# Patient Record
Sex: MALE | Race: WHITE | NOT HISPANIC OR LATINO | URBAN - METROPOLITAN AREA
[De-identification: names, ages, dates, MRNs, and addresses within clinical notes are randomized per-mention and may not be internally consistent; named-entity substitution may affect disease eponyms.]

---

## 2017-02-09 ENCOUNTER — COMMUNICATION - HEALTHEAST (OUTPATIENT)
Dept: TELEHEALTH | Facility: CLINIC | Age: 32
End: 2017-02-09

## 2017-02-09 ENCOUNTER — OFFICE VISIT - HEALTHEAST (OUTPATIENT)
Dept: INTERNAL MEDICINE | Facility: CLINIC | Age: 32
End: 2017-02-09

## 2017-02-09 DIAGNOSIS — Z72.0 TOBACCO ABUSE: ICD-10-CM

## 2017-02-09 DIAGNOSIS — Z00.00 HEALTHCARE MAINTENANCE: ICD-10-CM

## 2017-02-09 DIAGNOSIS — F10.90 HEAVY ALCOHOL CONSUMPTION: ICD-10-CM

## 2017-02-09 DIAGNOSIS — M54.42 LEFT-SIDED LOW BACK PAIN WITH SCIATICA, SCIATICA LATERALITY UNSPECIFIED, UNSPECIFIED CHRONICITY: ICD-10-CM

## 2017-02-09 RX ORDER — NAPROXEN 500 MG/1
500 TABLET ORAL EVERY 12 HOURS
Status: SHIPPED | COMMUNITY
Start: 2017-02-09

## 2017-02-09 RX ORDER — IBUPROFEN 200 MG
200 TABLET ORAL EVERY 6 HOURS PRN
Status: SHIPPED | COMMUNITY
Start: 2017-02-09

## 2017-02-09 ASSESSMENT — MIFFLIN-ST. JEOR: SCORE: 1974.89

## 2017-02-23 ENCOUNTER — OFFICE VISIT - HEALTHEAST (OUTPATIENT)
Dept: INTERNAL MEDICINE | Facility: CLINIC | Age: 32
End: 2017-02-23

## 2017-02-23 DIAGNOSIS — M99.04 SOMATIC DYSFUNCTION OF SACRAL REGION: ICD-10-CM

## 2017-02-23 DIAGNOSIS — M99.02 SOMATIC DYSFUNCTION OF THORACIC REGION: ICD-10-CM

## 2017-02-23 DIAGNOSIS — M54.16 LUMBAR RADICULOPATHY: ICD-10-CM

## 2017-02-23 DIAGNOSIS — M99.03 SOMATIC DYSFUNCTION OF LUMBAR REGION: ICD-10-CM

## 2017-02-23 DIAGNOSIS — M99.06 SOMATIC DYSFUNCTION OF LOWER EXTREMITIES: ICD-10-CM

## 2017-02-23 DIAGNOSIS — M99.08 SOMATIC DYSFUNCTION OF RIB: ICD-10-CM

## 2017-02-23 DIAGNOSIS — M99.05 SOMATIC DYSFUNCTION OF PELVIC REGION: ICD-10-CM

## 2017-02-23 ASSESSMENT — MIFFLIN-ST. JEOR: SCORE: 1963.55

## 2017-02-24 ENCOUNTER — OFFICE VISIT - HEALTHEAST (OUTPATIENT)
Dept: PHYSICAL THERAPY | Facility: REHABILITATION | Age: 32
End: 2017-02-24

## 2017-02-24 DIAGNOSIS — M79.18 MYOFASCIAL PAIN: ICD-10-CM

## 2017-02-24 DIAGNOSIS — M51.24 THORACIC DISC HERNIATION: ICD-10-CM

## 2017-02-24 DIAGNOSIS — M53.86 DECREASED ROM OF LUMBAR SPINE: ICD-10-CM

## 2017-02-24 DIAGNOSIS — M54.16 LEFT LUMBAR RADICULOPATHY: ICD-10-CM

## 2017-02-24 DIAGNOSIS — M53.84 DECREASED ROM OF THORACIC SPINE: ICD-10-CM

## 2017-02-24 DIAGNOSIS — M62.81 GENERALIZED MUSCLE WEAKNESS: ICD-10-CM

## 2017-02-28 ENCOUNTER — OFFICE VISIT - HEALTHEAST (OUTPATIENT)
Dept: PHYSICAL THERAPY | Facility: REHABILITATION | Age: 32
End: 2017-02-28

## 2017-02-28 DIAGNOSIS — M53.86 DECREASED ROM OF LUMBAR SPINE: ICD-10-CM

## 2017-02-28 DIAGNOSIS — M54.16 LEFT LUMBAR RADICULOPATHY: ICD-10-CM

## 2017-02-28 DIAGNOSIS — M79.18 MYOFASCIAL PAIN: ICD-10-CM

## 2017-02-28 DIAGNOSIS — M62.81 GENERALIZED MUSCLE WEAKNESS: ICD-10-CM

## 2017-02-28 DIAGNOSIS — M53.84 DECREASED ROM OF THORACIC SPINE: ICD-10-CM

## 2017-02-28 DIAGNOSIS — M51.24 THORACIC DISC HERNIATION: ICD-10-CM

## 2017-03-06 ENCOUNTER — OFFICE VISIT - HEALTHEAST (OUTPATIENT)
Dept: PHYSICAL THERAPY | Facility: REHABILITATION | Age: 32
End: 2017-03-06

## 2017-03-06 DIAGNOSIS — M53.84 DECREASED ROM OF THORACIC SPINE: ICD-10-CM

## 2017-03-06 DIAGNOSIS — M54.16 LEFT LUMBAR RADICULOPATHY: ICD-10-CM

## 2017-03-06 DIAGNOSIS — M53.86 DECREASED ROM OF LUMBAR SPINE: ICD-10-CM

## 2017-03-06 DIAGNOSIS — M62.81 GENERALIZED MUSCLE WEAKNESS: ICD-10-CM

## 2017-03-06 DIAGNOSIS — M51.24 THORACIC DISC HERNIATION: ICD-10-CM

## 2017-03-06 DIAGNOSIS — M79.18 MYOFASCIAL PAIN: ICD-10-CM

## 2017-03-09 ENCOUNTER — OFFICE VISIT - HEALTHEAST (OUTPATIENT)
Dept: INTERNAL MEDICINE | Facility: CLINIC | Age: 32
End: 2017-03-09

## 2017-03-09 ENCOUNTER — AMBULATORY - HEALTHEAST (OUTPATIENT)
Dept: LAB | Facility: CLINIC | Age: 32
End: 2017-03-09

## 2017-03-09 DIAGNOSIS — M99.08 SOMATIC DYSFUNCTION OF RIB: ICD-10-CM

## 2017-03-09 DIAGNOSIS — M99.02 SOMATIC DYSFUNCTION OF THORACIC REGION: ICD-10-CM

## 2017-03-09 DIAGNOSIS — M99.03 SOMATIC DYSFUNCTION OF LUMBAR REGION: ICD-10-CM

## 2017-03-09 DIAGNOSIS — Z00.00 HEALTHCARE MAINTENANCE: ICD-10-CM

## 2017-03-09 DIAGNOSIS — M54.16 LUMBAR RADICULOPATHY: ICD-10-CM

## 2017-03-09 DIAGNOSIS — M99.06 SOMATIC DYSFUNCTION OF LOWER EXTREMITIES: ICD-10-CM

## 2017-03-09 DIAGNOSIS — M99.04 SOMATIC DYSFUNCTION OF SACRAL REGION: ICD-10-CM

## 2017-03-09 DIAGNOSIS — M99.05 SOMATIC DYSFUNCTION OF PELVIC REGION: ICD-10-CM

## 2017-03-09 ASSESSMENT — MIFFLIN-ST. JEOR: SCORE: 1998.48

## 2018-10-27 ENCOUNTER — AMBULATORY - HEALTHEAST (OUTPATIENT)
Dept: NURSING | Facility: CLINIC | Age: 33
End: 2018-10-27

## 2019-06-18 ENCOUNTER — OFFICE VISIT - HEALTHEAST (OUTPATIENT)
Dept: FAMILY MEDICINE | Facility: CLINIC | Age: 34
End: 2019-06-18

## 2019-06-18 DIAGNOSIS — J36 PERITONSILLAR ABSCESS: ICD-10-CM

## 2019-10-11 ENCOUNTER — AMBULATORY - HEALTHEAST (OUTPATIENT)
Dept: NURSING | Facility: CLINIC | Age: 34
End: 2019-10-11

## 2019-10-11 DIAGNOSIS — Z23 NEED FOR INFLUENZA VACCINATION: ICD-10-CM

## 2021-02-17 ENCOUNTER — AMBULATORY - HEALTHEAST (OUTPATIENT)
Dept: FAMILY MEDICINE | Facility: CLINIC | Age: 36
End: 2021-02-17

## 2021-02-17 DIAGNOSIS — Z20.822 EXPOSURE TO COVID-19 VIRUS: ICD-10-CM

## 2021-05-29 NOTE — PROGRESS NOTES
"Chief Complaint   Patient presents with     Mouth Lesions     Pt c/o cant eat or talk, sores in his mouth since Sunday, fever, he takes 600 mg ibuprofen q5h.        HPI: 34-year-old male with a past medical history of EtOH and tobacco abuse, presents today with a 2-day history of severe throat pain, fever and difficulty swallowing.  He has also noticed a \"lump\" in his left neck for the past several weeks.  This is in the context of his daughters also having pharyngitis.    Old records from 3/9/2017 show patient has a history of lumbar radiculopathy.    ROS: Positive for fever.  Negative for vomiting or diarrhea or rashes.  10 point system review otherwise negative except for EtOH and tobacco use.    SH:    reports that he has been smoking cigarettes.  He does not have any smokeless tobacco history on file. He reports that he drinks about 15.0 oz of alcohol per week.      FH: The Patient's family history includes Alzheimer's disease in his maternal grandfather; Breast cancer in his maternal grandmother and paternal grandmother; Cancer in his maternal grandfather, maternal grandmother, and paternal grandfather; Colon cancer in his maternal grandfather; Macular degeneration in his maternal grandfather and paternal grandmother; Prostate cancer in his maternal grandfather; Skin cancer in his maternal grandmother, paternal grandfather, and paternal grandmother.     Meds:  Johann has a current medication list which includes the following prescription(s): ibuprofen and naproxen.    O:  /80   Pulse 82   Temp 98.8  F (37.1  C)   Resp 16   Wt (!) 224 lb (101.6 kg)   SpO2 99%   BMI 30.17 kg/m     Constitutional:    --Vitals as above  --No acute distress  Eyes-  --Sclera noninjected  --Lids and conjunctiva normal  ENT-  --TMs clear  --Sclera noninjected  --Pharynx markedly erythematous with 3+ swollen tonsils that are inflamed  Neck-  --Neck supple    Lymph-  --moderate painful cervical lymphadenopathy  Lungs-  --Clear " to Auscultation  Heart-  --Regular rate and rhythm  Skin-  --Pink and dry          A/P:   1. Peritonsillar abscess  The patient has concerns for peritonsillar abscess which could cause airway disruption and I am dispatching immediately to [facility] emergency department.  Case discussed with emergency room staff.

## 2021-05-30 VITALS — BODY MASS INDEX: 29.65 KG/M2 | WEIGHT: 218.9 LBS | HEIGHT: 72 IN

## 2021-05-30 VITALS — HEIGHT: 72 IN | WEIGHT: 216.4 LBS | BODY MASS INDEX: 29.31 KG/M2

## 2021-05-30 VITALS — BODY MASS INDEX: 30.35 KG/M2 | HEIGHT: 72 IN | WEIGHT: 224.1 LBS

## 2021-06-03 VITALS — WEIGHT: 224 LBS | BODY MASS INDEX: 30.17 KG/M2

## 2021-06-08 NOTE — PROGRESS NOTES
"ASSESSMENT:  1. Healthcare maintenance  Johann is a 31-year-old  for wine and beer, who presents to [facility] care.  First of all, he expressed concerns about heavy alcohol consumption, which he does not view as a problem in the sense of addiction or dependence, but more about general health.  See discussion below.  Check fasting labs in the future after 8 hour fast.  - Influenza, Seasonal,Quad Inj, 36+ MOS  - Tdap vaccine,  8yo or older,  IM  - Comprehensive Metabolic Panel; Future  - Lipid Cascade; Future    2. 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  He was interested in seeing the psychologist for this as well as his alcohol consumption and overall productiveness.  He was somewhat vague about this.  He has very analytical, and has a fairly circumferential thought pattern.  - Ambulatory referral to Psychology    3. Left-sided low back pain with sciatica, sciatica laterality unspecified, unspecified chronicity  He is getting left low back spasms, as he usually does when he gains weight.  He wants to do physical therapy, interested in OMT, but will first check for coverage.  He has a friend that went to [facility], interested in the osteopathic philosophy.  - Ambulatory referral to Physical Therapy  -We discussed weight loss, he is using myfitnesspal    4. Heavy alcohol consumption  See above.  He estimates drinking about 20 alcoholic beverages per week.  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  - Ambulatory referral to Psychology    5. BMI 29.0-29.9,adult  See above  - Ambulatory referral to " Psychology        PLAN:  Patient Instructions   Track alcohol intake over the week    Try to cut back to 14 or less per week     Return for osteopathic manual treatment     Back exercise    Recommend quitting smoking      Orders Placed This Encounter   Procedures     Influenza, Seasonal,Quad Inj, 36+ MOS     Tdap vaccine,  8yo or older,  IM     Comprehensive Metabolic Panel     Standing Status:   Future     Standing Expiration Date:   6/9/2017     Lipid Cascade     Standing Status:   Future     Standing Expiration Date:   6/9/2017     Order Specific Question:   Fasting is required?     Answer:   Yes     Ambulatory referral to Physical Therapy     Referral Priority:   Routine     Referral Type:   Physical Therapy     Referral Reason:   Evaluation and Treatment     Requested Specialty:   Physical Therapy     Number of Visits Requested:   1     Ambulatory referral to Psychology     Referral Priority:   Routine     Referral Type:   Behavioral Health     Referral Reason:   Evaluation and Treatment     Number of Visits Requested:   1     There are no discontinued medications.    Return for Next scheduled follow up.    CHIEF COMPLAINT:  Chief Complaint   Patient presents with     Annual Exam     Establish Care       HISTORY OF PRESENT ILLNESS:  Johann is a 31 y.o. male presenting to the clinic today as a new patient.     Smoking cessation: He smokes about half a pack a week. He would like to stop smoking but notes he only smokes when he is at work, on break. He feels ensconced in the restaurant culture which includes smoking at work and a beer after work.     Alcohol intake: He is a  and drinks alcohol for work. Though he tries to avoid drinking too much he admits that he ends up drinking quite a bit throughout the week. He would like to discuss strategies to lessen the impact of alcohol on his body. He estimates he drinks about 25 drinks per week. He would like to cut back He avoids drinking any alcohol on his days  off. He denies any alcohol dependence or binge drinking.     Gilbert: He has a history of Gilbert syndrome and has noticed that he is quick to jaundice if he does not hydrate himself adequately. He avoids tylenol.     Mood: He would like to see a therapist in order to pursue self improvement. He denies any anxiety or depression. He feels very happy and satisfied with his life. He specifically wants to become more efficient and focused.     Back spasms: He has been having back spasms, beginning in his left lower back while lifting his young daughter. He has noticed that is splayed to the right side when standing. He has been having radiation of pain down his left buttock into his left posterior thigh, to the knee. He denies any numbness. Though the majority of the radiation of pain has resolved he continues to have occasional buttock pain on the left. He has been using NSAID's and was recently prescribed tizanidine but did not tolerate it well, as it made him feel very groggy. He has a history of disc disease dating back to high school after herniating his disc. He noticed that his back becomes worse when he gains weight. He has been trying to improve core strength and work on weight loss.     REVIEW OF SYSTEMS:   He denies any fevers, chills, chest pain, diarrhea, urinary issues, constipation, numbness or tingling, SOB or rashes.   He is due for Tetanus vaccination.   All other systems are negative.    PFSH:  Two of his grandparents have macular degeneration.   He works at [employer] as a .   He has dislocated his right knee while mountain biking several times.   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   He is orginally from [place] but has moved around a lot.   He is  and has a child. His wife is pregnant with his second child.     He has a friend that went to [facility].    History   Smoking Status     Current Some Day Smoker     Types: Cigarettes   Smokeless Tobacco     Not  "on file       Family History   Problem Relation Age of Onset     Cancer Maternal Grandmother      Breast cancer Maternal Grandmother      Skin cancer Maternal Grandmother      Cancer Maternal Grandfather      Macular degeneration Maternal Grandfather      Alzheimer's disease Maternal Grandfather      Prostate cancer Maternal Grandfather      Colon cancer Maternal Grandfather      Macular degeneration Paternal Grandmother      Breast cancer Paternal Grandmother      Skin cancer Paternal Grandmother      Cancer Paternal Grandfather      Skin cancer Paternal Grandfather        Social History     Social History     Marital status:      Spouse name: N/A     Number of children: N/A     Years of education: N/A     Occupational History     Not on file.     Social History Main Topics     Smoking status: Current Some Day Smoker     Types: Cigarettes     Smokeless tobacco: Not on file     Alcohol use 15.0 oz/week     20 Glasses of wine, 5 Cans of beer per week      Comment: 20 drinks per week     Drug use: Not on file     Sexual activity: Not on file     Other Topics Concern     Not on file     Social History Narrative    He works as a [employer]. 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 He is orginally from [place] but has moved around a lot. He is  and has a child. His wife is pregnant with his second child.        Past Surgical History:   Procedure Laterality Date     APPENDECTOMY  2004     REPAIR NONUNION / MALUNION FEMUR  1998       No Known Allergies    Active Ambulatory Problems     Diagnosis Date Noted     Tobacco abuse 02/09/2017     Heavy alcohol consumption 02/09/2017     BMI 29.0-29.9,adult 02/09/2017     Resolved Ambulatory Problems     Diagnosis Date Noted     No Resolved Ambulatory Problems     Past Medical History:   Diagnosis Date     Lumbar disc herniation        VITALS:  Vitals:    02/09/17 1440   BP: 128/80   Pulse: 88   Weight: 218 lb 14.4 oz (99.3 kg)   Height: 6' 0.25\" (1.835 " m)     Wt Readings from Last 3 Encounters:   02/09/17 218 lb 14.4 oz (99.3 kg)       PHYSICAL EXAM:  General: Alert, talkative  Heart:  Regular rate and rhythm, no murmurs.   Lungs: Clear  Abdomen: Soft, No liver or spleen enlargement.   Musculoskeletal: Tight band of muscles in the left low back.   Neuro: Full strength in lower, 1+ patellar reflexes.   Psychiatric: Circumferential. Pleasant affect.     ADDITIONAL HISTORY SUMMARIZED (2): Notes from 1/17/2017 from [facility] family medicine reviewed regarding back pain.  DECISION TO OBTAIN EXTRA INFORMATION (1): Care everywhere accessed.   RADIOLOGY TESTS (1): None.  LABS (1): Labs ordered.  MEDICINE TESTS (1): None.  INDEPENDENT REVIEW (2 each): None.     QUALITY MEASURES:  The following high BMI interventions were performed this visit: encouragement to exercise I have counseled the patient for tobacco cessation and the follow up will occur  at the next visit.    The visit lasted a total of 42 minutes face to face with the patient. 8 minutes spent counseling tobacco cessation. Over 50% of the time was spent counseling and educating the patient about health maintenance.    ISamson, am scribing for and in the presence of, Dr. Page.    IDr. Page, personally performed the services described in this documentation, as scribed by Samson Goldberg in my presence, and it is both accurate and complete.    MEDICATIONS:  Current Outpatient Prescriptions   Medication Sig Dispense Refill     ibuprofen (ADVIL,MOTRIN) 200 MG tablet Take 200 mg by mouth every 6 (six) hours as needed for pain.       naproxen (NAPROSYN) 500 MG tablet Take 500 mg by mouth every 12 (twelve) hours.       TiZANidine (ZANAFLEX) 4 MG capsule Take 4 mg by mouth as needed for muscle spasms.       No current facility-administered medications for this visit.        Total data points: 4.     Samson Page,   Internal Medicine  [facility]

## 2021-06-09 NOTE — PROGRESS NOTES
ASSESSMENT:  1. Lumbar radiculopathy  Johann Wong is a 31-year-old man with history of elevated BMI, low back pain with radiculopathy, who presents for osteopathic manual treatment.  Gentle manipulation of the following somatic dysfunctions outlined below performed using the techniques soft tissue, muscle energy, HVLA.  Everything was well tolerated.  Recommended recheck in about 1 week, repeat full-body treatment with attention to tight hamstrings, piriformis, low back and pelvis.  He does not feel motivated enough to complete regular home exercises, though these were provided.  I recommended physical therapy.  If he is not responding to physical therapy or manipulation, recommend MRI, spine clinic evaluation.  He has not noticed any weakness in his legs.  - Ambulatory referral to Physical Therapy    2. Somatic dysfunction of thoracic region  3. Somatic dysfunction of lumbar region  4. Somatic dysfunction of sacral region  5. Somatic dysfunction of pelvic region  6. Somatic dysfunction of lower extremities  7. Somatic dysfunction of rib    Will do labs later      PLAN:  Patient Instructions   Osteopathic manual treatment performed today    Reschedule in 7-10 days    Referral to physical therapy    Preliminary back stretches/exercises provided today until more specific recommendations from physical therapy.      Orders Placed This Encounter   Procedures     Ambulatory referral to Physical Therapy     Referral Priority:   Routine     Referral Type:   Physical Therapy     Referral Reason:   Evaluation and Treatment     Requested Specialty:   Physical Therapy     Number of Visits Requested:   1     There are no discontinued medications.    Return in about 1 week (around 3/2/2017).    CHIEF COMPLAINT:  Chief Complaint   Patient presents with     Follow-up     back       HISTORY OF PRESENT ILLNESS:  Johann is a 31 y.o. male presenting to the clinic today for back spasms. He states that he is interested in osteopathic  "manual treatment today. He states that he has a difficult time pinpointing certain symptoms this week. There is not a particular movement or trigger that he can think of. He states that he has not had any sciatica symptoms this past week. The mid-lower back cramping has been occurring on his left back as well as in his left hip. The spasming is constant during an episode. He states that on the days when his left hip is giving him difficulties, he has noticed that his posture is skewed. There are some days where he feels like his right leg does not seem to be bearing as much weight as his left leg, particularly when he is in the midst of a back spasm. He states that he has not noticed much pain in his mid back because he has been very distracted by the pain and spasms in his low back. He states that he has been unable to exercise and feel like he can fully function because of the pain. During the exam he mentions that laying down on the table his left hip is causing him pain right in the socket and it seemed to move into his coccyx. His wife has been trying to do some manipulation at their home, but he does not believe she is doing it correctly as it has not been helpful. He has not done any back exercises. He has been monitoring his bending and lifting, but there have been times when he is working and his back will all of a sudden start spasming on him. He has been finding it difficult to do stretches or any kind of exercising.     REVIEW OF SYSTEMS:   He denies any tightness in his neck.   All other systems are negative.    PFSH:  Reviewed as above.    TOBACCO USE:  History   Smoking Status     Current Some Day Smoker     Types: Cigarettes   Smokeless Tobacco     Not on file       VITALS:  Vitals:    02/23/17 1339   BP: 120/78   Weight: 216 lb 6.4 oz (98.2 kg)   Height: 6' 0.25\" (1.835 m)     Wt Readings from Last 3 Encounters:   02/23/17 216 lb 6.4 oz (98.2 kg)   02/09/17 218 lb 14.4 oz (99.3 kg)     Body mass " index is 29.15 kg/(m^2).    PHYSICAL EXAM:  General: Alert, oriented, pleasant male.     Osteopathic exam  Cervical spine: no SD  Thoracic spine: : T3-5 rotated right, sidebent left. T8-10 rotated left, side bent right.   Ribs: 4th rib left posterior  Lumbar spine: tight paraspinal mm at thoracolumbar junction  Pelvis: AIR   Sacrum: Right sacral shear.   Lower ext: tight left piriformis, bilateral hamstrings      ADDITIONAL HISTORY SUMMARIZED (2): Reviewed physical exam from 2/09/2017; back spasms, interested in OMT.   DECISION TO OBTAIN EXTRA INFORMATION (1): None.   RADIOLOGY TESTS (1): None.  LABS (1): None.  MEDICINE TESTS (1): None.  INDEPENDENT REVIEW (2 each): None.       The visit lasted a total of 31 minutes face to face with the patient. Over 50% of the time was spent counseling and educating the patient about OMT for back pain.    Helena BOND, am scribing for and in the presence of, Dr. Page.    IDr. Page, personally performed the services described in this documentation, as scribed by Helena Paez in my presence, and it is both accurate and complete.    MEDICATIONS:  Current Outpatient Prescriptions   Medication Sig Dispense Refill     ibuprofen (ADVIL,MOTRIN) 200 MG tablet Take 200 mg by mouth every 6 (six) hours as needed for pain.       naproxen (NAPROSYN) 500 MG tablet Take 500 mg by mouth every 12 (twelve) hours.       TiZANidine (ZANAFLEX) 4 MG capsule Take 4 mg by mouth as needed for muscle spasms.       No current facility-administered medications for this visit.        Total data points:2

## 2021-06-09 NOTE — PROGRESS NOTES
[facility]   [facility]-Pelvic Initial Evaluation    Patient Name: Johann Wong  Date of evaluation: 2/28/2017  Referral Diagnosis: Lumbar radiculopathy  Referring provider: Samson Page DO  Visit Diagnosis:     ICD-10-CM    1. Thoracic disc herniation M51.24    2. Left lumbar radiculopathy M54.16    3. Generalized muscle weakness M62.81    4. Decreased ROM of lumbar spine M25.60    5. Decreased ROM of thoracic spine M25.60    6. Myofascial pain M79.1        Assessment:      Johann Wong is a 31 y.o. male who presents to therapy today with chief complaints of left sided middle back and lower back pain. Patient has a history of chronic low back pain with herniated lumbar discs in high school. His current episode of pain began about 2 months ago without specific injury, although patient feels that a combination of weight gain, work duties/body mechanics, and having a 2 year old.  Difficulty with working, lifting daughter, yard work/house work, bending, lighting objects, and transfers due to decreased ROM, weakness, and pain.  Pain symptoms are improving.  Patient demonstrates signs and sx consistent with thoracic disc herniation, left lumbar radiculopathy, weakness, and myofascial pain. Patient appears motivated for physical therapy and is appropriate for skilled therapy services.    Goals:  Pt. will demonstrate/verbalize independence in self-management of condition in : 6 weeks  Pt. will be independent with home exercise program in : 4 weeks  Patient will decrease : ISHAN score;by _ points;for improved quality of function;for improved quality of life;in 6 weeks  by ___ points: 6  Pt will: have pain-free thoracic AROM in order to improve QOL within 8 weeks.  Pt will: have pain-free lumbar AROM in order to improve QOL within 8 weeks.    Patient's expectations/goals are realistic.    Barriers to Learning or Achieving Goals:  Chronicity of the problem.       Plan / Patient Instructions:     "  Exercises:  Exercise #1: glute/piriformis  Comment #1: 30\" holds B  Exercise #2: standing HS stretch on stool/step  Comment #2: 30\" holds B     Plan of Care:   Patient Related Instruction: Nature of Condition;Treatment plan and rationale;Self Care instruction;Basis of treatment;Body mechanics;Posture;Precautions;Next steps;Expected outcome  Times per Week: 1-2  Number of Weeks: 6-8  Number of Visits: 12  Therapeutic Exercise: Stretching;ROM;Strengthening  Neuromuscular Reeducation: posture;kinesio tape;core  Manual Therapy: myofascial release;joint mobilization  Equipment: theraband    Plan for next visit: KT T and L spinous processes \"Star\" pattern, R S/L rotational mobs, review abd sets     Subjective:         Social information:   Occupation:[employer]   Work Status:Working full time    History of Present Illness:    Johann is a 31 y.o. male who presents to therapy today with complaints of chronic low back pain with current symptoms on the left thoracic and lumbar pain. His symptoms were radiating into posterior thigh but are now only into gluteal region. He had some herniation discs in high school and needed about therapy 1x/week for a year which seemed to help. His recent flare up began about 2 months ago.He has noticed that if he is above a certain weight, his pain is worse. His job requires quite a bit of bending and lifting. He is unable to lift his 2 year old due to pain. Symptoms are intermittent and getting better.  He describes their previous level of function as not limited. Taking antiinflammatories daily right now, and wants to be able to get off of those. Finds himself constantly shifting while sitting. Has to sleep prone. Is receiving osteopathic manipulation treatments.    Pain Rating:3  Pain rating at best: 1  Pain rating at worst: 6  Pain description: aching and sharp    Functional limitations are described as occurring with:   Lift  Get in/out of bed/chair/car  Bend  Reach into cupboard  Yard " work/house work  Carry laundry/groceries  Pickup up daughter  Working      Patient reports benefit from:  rest  , stretch, muscle relaxor, antiinflammatories         Objective:      Note: Items left blank indicates the item was not performed or not indicated at the time of the evaluation.    Patient Outcome Measures :    Modified Oswestry Low Back Pain Disablity Questionnaire  in %: 34   Scores range from 0-100%, where a score of 0% represents minimal pain and maximal function. The minimal clinically important difference is a score reduction of 12%.    Examination  1. Thoracic disc herniation     2. Left lumbar radiculopathy     3. Generalized muscle weakness     4. Decreased ROM of lumbar spine     5. Decreased ROM of thoracic spine     6. Myofascial pain       Involved side: Left  Gait: min antalgic, guarded    Lumbar ROM:    Date:      *Indicate scale AROM AROM AROM   Lumbar Flexion Mid tibia, limited by HS tightness. Slow movement     Lumbar Extension WNL, some increase in pain while returning to neutral. Slow movement      Right Left Right Left Right Left   Lumbar Sidebending WNL, stretch on left lower lumbar Min limited due to pain. Increases symptoms       Lumbar Rotation Min pain on the left, min-mod limited Mod limited due to severe increase in pain       Thoracic Flexion      Thoracic Extension      Thoracic Sidebending         Thoracic Rotation           Lower Extremity Strength:   4/5 B    Sensation    Intact to light touch per subjective    Palpation: tender T6-8 spinous processes and lateral of to the left, tender L PSIS, left L5 transverse processes    Lumbar Special Tests:     Lumbar Special Tests Right Left SI Tests Right  Left   Quadrant test   SI Compression     Straight leg raise neg neg SI Distraction     Crossover response neg neg POSH Test neg neg   Slump neg neg Sacral Thrust neg neg   Sit-up test  FADIR     Trunk extensor endurance test  Resisted Abduction     Prone instability test  Other:  SLS neg neg   Pubic shotgun  Other:         LE Screen:  B LE tightness noted, R>L    Treatment Today     TREATMENT MINUTES COMMENTS   Evaluation 25    Self-care/ Home management 10 Discussed proper lifting techniques, icing/heating, posture, and body mechanics.   Manual therapy     Neuromuscular Re-education     Therapeutic Activity     Therapeutic Exercises 15 Discussed PT POC and pathology of condition. Began HEP. Answered patient questions   Gait training     Modality__________________                Total 50    Blank areas are intentional and mean the treatment did not include these items.       PT Evaluation Code: (Please list factors)  Patient History/Comorbidities: hx of low back pain. No other co-morbidities affecting therapy listed in chart  Examination: thoracic pain, lumbar pain  Clinical Presentation: stable  Clinical Decision Making: low    Patient History/  Comorbidities Examination  (body structures and functions, activity limitations, and/or participation restrictions) Clinical Presentation Clinical Decision Making (Complexity)   No documented Comorbidities or personal factors 1-2 Elements Stable and/or uncomplicated Low   1-2 documented comorbidities or personal factor 3 Elements Evolving clinical presentation with changing characteristics Moderate   3-4 documented comorbidities or personal factors 4 or more Unstable and unpredictable High            Christian Orlando, PT, DPT  2/28/2017  9:31 AM

## 2021-06-09 NOTE — PROGRESS NOTES
ASSESSMENT:  1. Lumbar radiculopathy  Johann Wong is here to follow-up with persistent symptoms of lumbar radiculopathy.  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  He has taken the week off work, and back has been not bothering him much for the last 2-3 days.  He did notice improvement and relief after the last osteopathic manual treatment.  He would like to pursue treatment again today, which was completed and tolerated well.  Overall, I recommended spine clinic referral to be initiated by the end of next week if he has not continuing to experience definite improvement.  MRI could be done if entertaining surgery or if needed for epidural steroid injection.  He has a long history of back issues dating back to high school, which is probably when his last MRI was done by his estimate.  If he continues to improve, continue physical therapy (at home if he cannot afford PT sessions) and OMT as needed.  --Reinforced weight loss recommendation    2. Somatic dysfunction of thoracic region  Treated with muscle energy and soft tissue technique    3. Somatic dysfunction of lumbar region  Treated with muscle energy and soft tissue technique    4. Somatic dysfunction of sacral region  Treated with HVLA    5. Somatic dysfunction of pelvic region  Treated with HVLA    6. Somatic dysfunction of lower extremities  Treated with muscle energy    7. Somatic dysfunction of rib  Treated with HVLA and facilitated positional release        PLAN:  Patient Instructions   Continue physical therapy and tizanidine if needed     MyFitness Pal-- caloric intake.  1-2 lbs/wk goal weight loss rate    By March 16/17-- if not definitely improving, spine care referral     If you are improving-- we should follow-up with you in the latter half of the month       No orders of the defined types were placed in this encounter.    There are no discontinued medications.    Return in about 2 weeks (around 3/23/2017)  "for Recheck.    CHIEF COMPLAINT:  Chief Complaint   Patient presents with     Follow-up     Lumbar pain       HISTORY OF PRESENT ILLNESS:  Johann is a 31 y.o. male presenting to the clinic today for follow up for back pain and spasm. He continues to have spasms in his lower back. His flare ups are occurring about every 3 days. He is being very deliberate with measures to avoid spasm; avoiding positioning that will trigger spasm, attending PT, avoiding lifting, sleeping on the guest room bed which is more firm. Heat and cold have been helpful as well as stretching exercises. He is noticing more pain with inhalation. His diffuse lumbar pain is improved following manipulation in the clinic at his last visit. He is treating with tizanidine three times daily. If he is getting enough sleep, which he is presently, and does not need to drive, the tizanidine is not too sedating, although he is not sure how helpful it has been in loosening the muscles involved in spasm. He has not been going to work. 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     He is also wondering if nerve sensitivity is playing a part in his pain. He had some sciatic pain early this week.     REVIEW OF SYSTEMS:   He reports 5 patellar dislocations. All other systems are negative.    PFSH:  His father is a physical therapist. He works as a  at a restaurant.     TOBACCO USE:  History   Smoking Status     Current Some Day Smoker     Types: Cigarettes   Smokeless Tobacco     Not on file       VITALS:  Vitals:    03/09/17 1321   BP: 126/84   Pulse: 80   Weight: (!) 224 lb 1.6 oz (101.7 kg)   Height: 6' 0.25\" (1.835 m)     Wt Readings from Last 3 Encounters:   03/09/17 (!) 224 lb 1.6 oz (101.7 kg)   02/23/17 216 lb 6.4 oz (98.2 kg)   02/09/17 218 lb 14.4 oz (99.3 kg)     Body mass index is 30.18 kg/(m^2).    PHYSICAL EXAM:  General: Alert and talkative. Stands during visit due to pain.   Thoracic: T4-7 " rotated right side bent left. T12: side bent right rotated left.   Pelvis: IAR L5-S1 rotated to the right side bent left.   Sacrum : right on right forward sacral torsion.   Quadricept muscles normal. Interval improvement in hamstring range of motion.   Ribs:posterior rib 9 on the left.    ADDITIONAL HISTORY SUMMARIZED (2): rev FM note from 1/17/17 RE: low back pain treatment .  DECISION TO OBTAIN EXTRA INFORMATION (1): SASHA for Care Everywhere obtained.    RADIOLOGY TESTS (1): None.  LABS (1): None.  MEDICINE TESTS (1): None.  INDEPENDENT REVIEW (2 each): None.     The visit lasted a total of 25 minutes face to face with the patient. Over 50% of the time was spent counseling and educating the patient about back pain.    Raj BOND, am scribing for and in the presence of, Dr. Page.    IDr. Page, personally performed the services described in this documentation, as scribed by Raj Huerta in my presence, and it is both accurate and complete.    MEDICATIONS:  Current Outpatient Prescriptions   Medication Sig Dispense Refill     ibuprofen (ADVIL,MOTRIN) 200 MG tablet Take 200 mg by mouth every 6 (six) hours as needed for pain.       naproxen (NAPROSYN) 500 MG tablet Take 500 mg by mouth every 12 (twelve) hours.       TiZANidine (ZANAFLEX) 4 MG capsule Take 4 mg by mouth as needed for muscle spasms.       No current facility-administered medications for this visit.        Total data points: 3

## 2021-06-09 NOTE — PROGRESS NOTES
[facility] Discharge Summary  Patient Name: Johann Wong  Date: 4/5/2017  Referral Diagnosis: lumbar radiculopathy   Referring provider: Samson Page DO  Visit Diagnosis:   1. Thoracic disc herniation     2. Left lumbar radiculopathy     3. Generalized muscle weakness     4. Decreased ROM of lumbar spine     5. Decreased ROM of thoracic spine     6. Myofascial pain         Goals:  Pt. will demonstrate/verbalize independence in self-management of condition in : 6 weeks;Not Met  Pt. will be independent with home exercise program in : 4 weeks;Not Met  Patient will decrease : ISHAN score;by _ points;for improved quality of function;for improved quality of life;in 6 weeks;Not Met  by ___ points: 6  Pt will: have pain-free thoracic AROM in order to improve QOL within 8 weeks. (goal not met)  Pt will: have pain-free lumbar AROM in order to improve QOL within 8 weeks. (goal not met)    Patient was seen for 3 visits.  The patient attended therapy initially, but did not finish the therapy sessions prescribed.  Goals were not fully achieved. Explanation for goals not achieved: Patient did not return to clinic after 3rd visit so final tests/measurements not taken   The patient was issued a HEP and instructed in proper usage.    Therapy will be discontinued at this time.  The patient will need a new referral to resume.    Thank you for your referral.  Christian Orlando, PT, DPT  4/5/2017  3:41 PM    [facility] Daily Progress     Patient Name: Johann Wong  Date: 2/28/2017  Visit #: 3/5  PTA visit #:    Referral Diagnosis: lumbar radiculopathy  Referring provider: Samson Page, DO  Visit Diagnosis:     ICD-10-CM    1. Thoracic disc herniation M51.24    2. Left lumbar radiculopathy M54.16    3. Generalized muscle weakness M62.81    4. Decreased ROM of lumbar spine M25.60    5. Decreased ROM of thoracic spine M25.60    6. Myofascial pain M79.1          Assessment:   Patient tolerated MT well  "today. He had increased SB ROM and decreased pain with these movements after MT. I stressed the importance of avoiding flexion-based activities at this time, as well as avoiding reading in bed. We discussed that it might be helpful to sleep in a spare bedroom with a firmer bed to see if this helps. If the bed change does help, it might be a good idea to look into a newer, firmer bed in the future. Patient states that he has high co-pays and will only be able to come to therapy 1x/week now due to cost. Patient states that he is following up with Dr. Page on Thursday for some metabolic testing for his low back pain.    HEP/POC compliance is  good .  Patient demonstrates understanding/independence with home program.  Patient is benefitting from skilled physical therapy and is making steady progress toward functional goals.    Current Goals:  Pt. will demonstrate/verbalize independence in self-management of condition in : 6 weeks;Not Met  Pt. will be independent with home exercise program in : 4 weeks;Not Met   Patient will decrease : ISHAN score;by _ points;for improved quality of function;for improved quality of life;in 6 weeks;Not Met  by ___ points: 6  Pt will: have pain-free thoracic AROM in order to improve QOL within 8 weeks. (goal not met)  Pt will: have pain-free lumbar AROM in order to improve QOL within 8 weeks. (goal not met)    Plan / Patient Education:     Continue with initial plan of care.  Progress with home program as tolerated.     Exercises:  Exercise #1: glute/piriformis  Comment #1: 30\" holds B  Exercise #2: standing HS stretch on stool/step  Comment #2: 30\" holds B  Exercise #3: supine/seated nerve glides  Comment #3: x20  Exercise #4: standing lumbar ext  Comment #4: (Patient doing on his own)    Plan for next visit:  R S/L rotational mobs, attempt abd sets again if appropriate    Subjective:     Pain Rating: not much currently, but was very flared up this morning    Worst pain in the month when " "he woke up today. Didn't have pain until he woke up this morning. Seeing MD on Thursday due to back issues. Had another birthday party over the weekend for his daughter. Patient's dad is a PT and his dad did some manual therapy, but patient didn't notice any increase in pain during or after his dad's treatment.    Sleeping in old bed that is caved in. Also a soft bed. Likes to read in bed with head propped on pillows before bed.      Objective:       Lumbar AROM after MT:   Flexion: pain-free, distal tibia   Extension: WNL, pain-free   L SB: WNL and pain-free, \"looser than last session\"   R SB: WNL and pain-free, \"looser than last session\"  Negative slump B      Treatment Today     TREATMENT MINUTES COMMENTS   Evaluation     Self-care/ Home management     Manual therapy 14 In R s/l: left rotational mobs Gr I-II. PA's to thoracic and lumbar spinous processes and left facets Gr I-II.    Neuromuscular Re-education     Therapeutic Activity     Therapeutic Exercises 12 Treadmill for warm-up while discussing progress: 5'. Objective measures taken. Added in supine/seated nerve glides if patient flares up.   Gait training     Modality__________________                Total 26    Blank areas are intentional and mean the treatment did not include these items.       Christian Orlando, PT, DPT  2/28/2017  "

## 2021-06-09 NOTE — PROGRESS NOTES
"[facility] Daily Progress     Patient Name: Johann Wong  Date: 2/28/2017  Visit #: 2  PTA visit #:    Referral Diagnosis: lumbar radiculopathy  Referring provider: Samson Page DO  Visit Diagnosis:     ICD-10-CM    1. Thoracic disc herniation M51.24    2. Left lumbar radiculopathy M54.16    3. Generalized muscle weakness M62.81    4. Decreased ROM of lumbar spine M25.60    5. Decreased ROM of thoracic spine M25.60    6. Myofascial pain M79.1          Assessment:   Patient responded well to today's treatment of MT with feeling \"looser\" and more pain-free AROM than last session. Increase pain with abd sets today; terminated activity  HEP/POC compliance is  good .  Patient demonstrates understanding/independence with home program.  Patient is benefitting from skilled physical therapy and is making steady progress toward functional goals.    Goal Status:  Pt. will demonstrate/verbalize independence in self-management of condition in : 6 weeks  Pt. will be independent with home exercise program in : 4 weeks  Patient will decrease : ISHAN score;by _ points;for improved quality of function;for improved quality of life;in 6 weeks  by ___ points: 6  Pt will: have pain-free thoracic AROM in order to improve QOL within 8 weeks.  Pt will: have pain-free lumbar AROM in order to improve QOL within 8 weeks.    Plan / Patient Education:     Continue with initial plan of care.  Progress with home program as tolerated.     Exercises:  Exercise #1: glute/piriformis  Comment #1: 30\" holds B  Exercise #2: standing HS stretch on stool/step  Comment #2: 30\" holds B    Plan for next visit: KT T and L spinous processes \"Star\" pattern, R S/L rotational mobs, attempt abd sets again if appropriate    Subjective:     Pain Rating: none today    Hasn't worked since last session. Has been implementing movement patterns with daughter and such. Has been doing stretches 2-3x/day. Hasn't noticed any increased flexibility " "yet      Objective:     5' late to appt  Lumbar AROM after MT:   Flexion: increased pain, mid tibia   Extension: WNL, pain-free   L SB: WNL and pain-free, \"looser than last session\"   R SB: WNL and pain-free, \"looser than last session\"  Palpation: tender T8-L1 spinous processes, L of T8/9 mod tender  Increased pain with abd sets in supine; terminated activitiy    Treatment Today     TREATMENT MINUTES COMMENTS   Evaluation     Self-care/ Home management     Manual therapy 12 In R s/l: left rotational mobs Gr I-II. PA's to thoracic and lumbar spinous processes and left facets Gr I-II.    Neuromuscular Re-education     Therapeutic Activity     Therapeutic Exercises 8 Treadmill for warm-up while discussing progress: 5'. Attempted abd sets; terminated due to increase in thoracic pain   Gait training     Modality__________________                Total 20    Blank areas are intentional and mean the treatment did not include these items.       Christian Orlando, PT, DPT  2/28/2017    "

## 2021-06-15 PROBLEM — M54.16 LUMBAR RADICULOPATHY: Status: ACTIVE | Noted: 2017-02-23

## 2021-06-15 PROBLEM — Z72.0 TOBACCO ABUSE: Status: ACTIVE | Noted: 2017-02-09

## 2021-06-15 PROBLEM — F10.90 HEAVY ALCOHOL CONSUMPTION: Status: ACTIVE | Noted: 2017-02-09
